# Patient Record
Sex: MALE | Race: WHITE | Employment: FULL TIME | URBAN - METROPOLITAN AREA
[De-identification: names, ages, dates, MRNs, and addresses within clinical notes are randomized per-mention and may not be internally consistent; named-entity substitution may affect disease eponyms.]

---

## 2018-03-01 ENCOUNTER — HOSPITAL ENCOUNTER (EMERGENCY)
Age: 53
Discharge: HOME | End: 2018-03-01
Attending: EMERGENCY MEDICINE
Payer: COMMERCIAL

## 2018-03-01 VITALS
DIASTOLIC BLOOD PRESSURE: 65 MMHG | WEIGHT: 160 LBS | SYSTOLIC BLOOD PRESSURE: 106 MMHG | TEMPERATURE: 100.2 F | OXYGEN SATURATION: 100 % | HEIGHT: 67 IN | BODY MASS INDEX: 25.11 KG/M2 | HEART RATE: 54 BPM | RESPIRATION RATE: 16 BRPM

## 2018-03-01 DIAGNOSIS — J10.1 INFLUENZA DUE TO INFLUENZA VIRUS, TYPE B: Primary | ICD-10-CM

## 2018-03-01 LAB
DIRECT EXAM: ABNORMAL
Lab: ABNORMAL
SPECIMEN DESCRIPTION: ABNORMAL
STATUS: ABNORMAL

## 2018-03-01 PROCEDURE — 87804 INFLUENZA ASSAY W/OPTIC: CPT

## 2018-03-01 PROCEDURE — 99283 EMERGENCY DEPT VISIT LOW MDM: CPT

## 2018-03-01 RX ORDER — OSELTAMIVIR PHOSPHATE 75 MG/1
75 CAPSULE ORAL 2 TIMES DAILY
Qty: 10 CAPSULE | Refills: 1 | Status: SHIPPED | OUTPATIENT
Start: 2018-03-01 | End: 2018-03-06

## 2018-03-01 NOTE — ED PROVIDER NOTES
[facility] ED  [address] [place] [address]  Phone: [phone]  Fax: [phone]    Pt Name: Austen Sharp  MRN: [id number]  [place] 1965  Date of evaluation: 3/1/2018      CHIEF COMPLAINT       Chief Complaint   Patient presents with    Fever     x 2-3 days    Generalized Body Aches    Cough         HISTORY OF PRESENT ILLNESS     Austen Sharp is a 45 y.o. male who presents With fever for the past 2-3 days but worsened overnight with body aches coughing without sputum production and nasal congestion. No sore throat. No headache. He has some diffuse muscle aches. There is no wheezing shortness of breath chest pain palpitations or hemoptysis. No abdominal pain. He's been able to eat and drink normally. He brought himself here for care. His vital signs are normal with exception of a temp of 37.9 upon admission        REVIEW OF SYSTEMS         REVIEW OF SYSTEMS    Constitutional:  As above  Eyes:  Denies vision changes  HEENT: As above  Respiratory:  As above  Cardiovascular:  Denies chest pain  GI:  Denies abdominal pain, nausea, vomiting, or diarrhea   Musculoskeletal: As above  Skin:  No rash on exposed surfaces   Neurologic:  Normal baseline mentation. No new deficits. Lymphatic:   No nodes or infection  Psychiatric:  No psychosis. Alert and interacting normally. Other ROS negative except as noted above. PAST MEDICAL HISTORY    has no past medical history on file. SURGICAL HISTORY      has no past surgical history on file. CURRENT MEDICATIONS       Previous Medications    No medications on file       ALLERGIES     has No Known Allergies. FAMILY HISTORY     has no family status information on file. family history is not on file. SOCIAL HISTORY      reports that he has quit smoking. He has never used smokeless tobacco. He reports that he does not drink alcohol or use drugs.     PHYSICAL EXAM     INITIAL VITALS:  height is 5' 7\" (1.702 m) and

## 2021-07-19 ENCOUNTER — HOSPITAL ENCOUNTER (EMERGENCY)
Age: 56
Discharge: ANOTHER HOSPITAL | End: 2021-07-20
Attending: EMERGENCY MEDICINE
Payer: COMMERCIAL

## 2021-07-19 ENCOUNTER — APPOINTMENT (OUTPATIENT)
Dept: GENERAL RADIOLOGY | Age: 56
End: 2021-07-19
Payer: COMMERCIAL

## 2021-07-19 ENCOUNTER — APPOINTMENT (OUTPATIENT)
Dept: CT IMAGING | Age: 56
End: 2021-07-19
Payer: COMMERCIAL

## 2021-07-19 DIAGNOSIS — S47.2XXA CRUSHING INJURY OF LEFT UPPER EXTREMITY, INITIAL ENCOUNTER: Primary | ICD-10-CM

## 2021-07-19 LAB
ABSOLUTE EOS #: 0 K/UL (ref 0–0.4)
ABSOLUTE IMMATURE GRANULOCYTE: ABNORMAL K/UL (ref 0–0.3)
ABSOLUTE LYMPH #: 1 K/UL (ref 1–4.8)
ABSOLUTE MONO #: 0.5 K/UL (ref 0.1–1.3)
ALBUMIN SERPL-MCNC: 4.8 G/DL (ref 3.5–5.2)
ALBUMIN/GLOBULIN RATIO: ABNORMAL (ref 1–2.5)
ALP BLD-CCNC: 55 U/L (ref 40–129)
ALT SERPL-CCNC: 16 U/L (ref 5–41)
ANION GAP SERPL CALCULATED.3IONS-SCNC: 10 MMOL/L (ref 9–17)
AST SERPL-CCNC: 29 U/L
BASOPHILS # BLD: 1 % (ref 0–2)
BASOPHILS ABSOLUTE: 0.1 K/UL (ref 0–0.2)
BILIRUB SERPL-MCNC: 0.26 MG/DL (ref 0.3–1.2)
BUN BLDV-MCNC: 26 MG/DL (ref 6–20)
BUN/CREAT BLD: ABNORMAL (ref 9–20)
CALCIUM SERPL-MCNC: 10 MG/DL (ref 8.6–10.4)
CHLORIDE BLD-SCNC: 102 MMOL/L (ref 98–107)
CO2: 28 MMOL/L (ref 20–31)
CREAT SERPL-MCNC: 1.19 MG/DL (ref 0.7–1.2)
DIFFERENTIAL TYPE: ABNORMAL
EOSINOPHILS RELATIVE PERCENT: 0 % (ref 0–4)
GFR AFRICAN AMERICAN: >60 ML/MIN
GFR NON-AFRICAN AMERICAN: 60 ML/MIN
GFR NON-AFRICAN AMERICAN: >60 ML/MIN
GFR SERPL CREATININE-BSD FRML MDRD: >60 ML/MIN
GFR SERPL CREATININE-BSD FRML MDRD: ABNORMAL ML/MIN/{1.73_M2}
GLUCOSE BLD-MCNC: 129 MG/DL (ref 70–99)
HCT VFR BLD CALC: 39.5 % (ref 41–53)
HEMOGLOBIN: 13.2 G/DL (ref 13.5–17.5)
IMMATURE GRANULOCYTES: ABNORMAL %
LYMPHOCYTES # BLD: 8 % (ref 24–44)
MCH RBC QN AUTO: 31 PG (ref 26–34)
MCHC RBC AUTO-ENTMCNC: 33.4 G/DL (ref 31–37)
MCV RBC AUTO: 92.9 FL (ref 80–100)
MONOCYTES # BLD: 4 % (ref 1–7)
NRBC AUTOMATED: ABNORMAL PER 100 WBC
PDW BLD-RTO: 14.1 % (ref 11.5–14.9)
PLATELET # BLD: 235 K/UL (ref 150–450)
PLATELET ESTIMATE: ABNORMAL
PMV BLD AUTO: 7.8 FL (ref 6–12)
POC CREATININE: 1.24 MG/DL (ref 0.51–1.19)
POTASSIUM SERPL-SCNC: 4.2 MMOL/L (ref 3.7–5.3)
RBC # BLD: 4.26 M/UL (ref 4.5–5.9)
RBC # BLD: ABNORMAL 10*6/UL
SEG NEUTROPHILS: 87 % (ref 36–66)
SEGMENTED NEUTROPHILS ABSOLUTE COUNT: 10.3 K/UL (ref 1.3–9.1)
SODIUM BLD-SCNC: 140 MMOL/L (ref 135–144)
TOTAL CK: 485 U/L (ref 39–308)
TOTAL PROTEIN: 7.9 G/DL (ref 6.4–8.3)
WBC # BLD: 12 K/UL (ref 3.5–11)
WBC # BLD: ABNORMAL 10*3/UL

## 2021-07-19 PROCEDURE — 96374 THER/PROPH/DIAG INJ IV PUSH: CPT

## 2021-07-19 PROCEDURE — 85025 COMPLETE CBC W/AUTO DIFF WBC: CPT

## 2021-07-19 PROCEDURE — 73080 X-RAY EXAM OF ELBOW: CPT

## 2021-07-19 PROCEDURE — 6360000002 HC RX W HCPCS: Performed by: EMERGENCY MEDICINE

## 2021-07-19 PROCEDURE — 80053 COMPREHEN METABOLIC PANEL: CPT

## 2021-07-19 PROCEDURE — 82550 ASSAY OF CK (CPK): CPT

## 2021-07-19 PROCEDURE — 36415 COLL VENOUS BLD VENIPUNCTURE: CPT

## 2021-07-19 PROCEDURE — 73110 X-RAY EXAM OF WRIST: CPT

## 2021-07-19 PROCEDURE — 6360000004 HC RX CONTRAST MEDICATION: Performed by: EMERGENCY MEDICINE

## 2021-07-19 PROCEDURE — 73206 CT ANGIO UPR EXTRM W/O&W/DYE: CPT

## 2021-07-19 PROCEDURE — 2580000003 HC RX 258: Performed by: EMERGENCY MEDICINE

## 2021-07-19 PROCEDURE — 73090 X-RAY EXAM OF FOREARM: CPT

## 2021-07-19 PROCEDURE — 82565 ASSAY OF CREATININE: CPT

## 2021-07-19 PROCEDURE — 99284 EMERGENCY DEPT VISIT MOD MDM: CPT

## 2021-07-19 RX ORDER — 0.9 % SODIUM CHLORIDE 0.9 %
80 INTRAVENOUS SOLUTION INTRAVENOUS ONCE
Status: COMPLETED | OUTPATIENT
Start: 2021-07-19 | End: 2021-07-20

## 2021-07-19 RX ORDER — SODIUM CHLORIDE 0.9 % (FLUSH) 0.9 %
10 SYRINGE (ML) INJECTION PRN
Status: DISCONTINUED | OUTPATIENT
Start: 2021-07-19 | End: 2021-07-20 | Stop reason: HOSPADM

## 2021-07-19 RX ORDER — MORPHINE SULFATE 4 MG/ML
4 INJECTION, SOLUTION INTRAMUSCULAR; INTRAVENOUS ONCE
Status: COMPLETED | OUTPATIENT
Start: 2021-07-19 | End: 2021-07-19

## 2021-07-19 RX ORDER — 0.9 % SODIUM CHLORIDE 0.9 %
1000 INTRAVENOUS SOLUTION INTRAVENOUS ONCE
Status: COMPLETED | OUTPATIENT
Start: 2021-07-19 | End: 2021-07-20

## 2021-07-19 RX ADMIN — SODIUM CHLORIDE 1000 ML: 9 INJECTION, SOLUTION INTRAVENOUS at 22:34

## 2021-07-19 RX ADMIN — SODIUM CHLORIDE, PRESERVATIVE FREE 10 ML: 5 INJECTION INTRAVENOUS at 23:14

## 2021-07-19 RX ADMIN — IOPAMIDOL 100 ML: 755 INJECTION, SOLUTION INTRAVENOUS at 23:14

## 2021-07-19 RX ADMIN — MORPHINE SULFATE 4 MG: 4 INJECTION, SOLUTION INTRAMUSCULAR; INTRAVENOUS at 22:34

## 2021-07-19 RX ADMIN — SODIUM CHLORIDE 80 ML: 9 INJECTION, SOLUTION INTRAVENOUS at 23:14

## 2021-07-19 ASSESSMENT — ENCOUNTER SYMPTOMS
ABDOMINAL PAIN: 0
EYE PAIN: 0
SHORTNESS OF BREATH: 0
BACK PAIN: 0
COLOR CHANGE: 0

## 2021-07-19 ASSESSMENT — PAIN SCALES - GENERAL: PAINLEVEL_OUTOF10: 9

## 2021-07-20 ENCOUNTER — HOSPITAL ENCOUNTER (EMERGENCY)
Age: 56
Discharge: HOME | End: 2021-07-20
Attending: EMERGENCY MEDICINE
Payer: COMMERCIAL

## 2021-07-20 VITALS
WEIGHT: 160 LBS | OXYGEN SATURATION: 97 % | DIASTOLIC BLOOD PRESSURE: 78 MMHG | HEART RATE: 60 BPM | BODY MASS INDEX: 25.11 KG/M2 | RESPIRATION RATE: 16 BRPM | HEIGHT: 67 IN | SYSTOLIC BLOOD PRESSURE: 108 MMHG | TEMPERATURE: 97.9 F

## 2021-07-20 VITALS
HEART RATE: 88 BPM | TEMPERATURE: 99.1 F | OXYGEN SATURATION: 93 % | DIASTOLIC BLOOD PRESSURE: 77 MMHG | WEIGHT: 160 LBS | BODY MASS INDEX: 25.06 KG/M2 | SYSTOLIC BLOOD PRESSURE: 105 MMHG | RESPIRATION RATE: 18 BRPM

## 2021-07-20 DIAGNOSIS — S49.92XA INJURY OF LEFT UPPER EXTREMITY, INITIAL ENCOUNTER: Primary | ICD-10-CM

## 2021-07-20 PROCEDURE — 6360000002 HC RX W HCPCS: Performed by: STUDENT IN AN ORGANIZED HEALTH CARE EDUCATION/TRAINING PROGRAM

## 2021-07-20 PROCEDURE — 6360000002 HC RX W HCPCS: Performed by: EMERGENCY MEDICINE

## 2021-07-20 PROCEDURE — 99285 EMERGENCY DEPT VISIT HI MDM: CPT

## 2021-07-20 PROCEDURE — 96374 THER/PROPH/DIAG INJ IV PUSH: CPT

## 2021-07-20 PROCEDURE — 90471 IMMUNIZATION ADMIN: CPT | Performed by: EMERGENCY MEDICINE

## 2021-07-20 PROCEDURE — 90715 TDAP VACCINE 7 YRS/> IM: CPT | Performed by: EMERGENCY MEDICINE

## 2021-07-20 RX ORDER — OXYCODONE HYDROCHLORIDE AND ACETAMINOPHEN 5; 325 MG/1; MG/1
1 TABLET ORAL EVERY 6 HOURS PRN
Qty: 12 TABLET | Refills: 0 | Status: SHIPPED | OUTPATIENT
Start: 2021-07-20 | End: 2021-07-23

## 2021-07-20 RX ADMIN — HYDROMORPHONE HYDROCHLORIDE 1 MG: 1 INJECTION, SOLUTION INTRAMUSCULAR; INTRAVENOUS; SUBCUTANEOUS at 03:41

## 2021-07-20 RX ADMIN — TETANUS TOXOID, REDUCED DIPHTHERIA TOXOID AND ACELLULAR PERTUSSIS VACCINE, ADSORBED 0.5 ML: 5; 2.5; 8; 8; 2.5 SUSPENSION INTRAMUSCULAR at 00:30

## 2021-07-20 ASSESSMENT — ENCOUNTER SYMPTOMS
NAUSEA: 0
ABDOMINAL PAIN: 0
SORE THROAT: 0
VOMITING: 0
RHINORRHEA: 0
COUGH: 0
SHORTNESS OF BREATH: 0
DIARRHEA: 0

## 2021-07-20 ASSESSMENT — PAIN DESCRIPTION - LOCATION
LOCATION: ARM
LOCATION: ARM

## 2021-07-20 ASSESSMENT — PAIN SCALES - GENERAL
PAINLEVEL_OUTOF10: 8
PAINLEVEL_OUTOF10: 2
PAINLEVEL_OUTOF10: 8

## 2021-07-20 ASSESSMENT — PAIN DESCRIPTION - PAIN TYPE
TYPE: ACUTE PAIN
TYPE: ACUTE PAIN

## 2021-07-20 ASSESSMENT — PAIN DESCRIPTION - ONSET: ONSET: SUDDEN

## 2021-07-20 ASSESSMENT — PAIN DESCRIPTION - ORIENTATION
ORIENTATION: LEFT
ORIENTATION: LEFT

## 2021-07-20 ASSESSMENT — PAIN DESCRIPTION - DESCRIPTORS: DESCRIPTORS: SHARP;SHOOTING

## 2021-07-20 ASSESSMENT — PAIN - FUNCTIONAL ASSESSMENT: PAIN_FUNCTIONAL_ASSESSMENT: PREVENTS OR INTERFERES SOME ACTIVE ACTIVITIES AND ADLS

## 2021-07-20 ASSESSMENT — PAIN DESCRIPTION - FREQUENCY: FREQUENCY: INTERMITTENT

## 2021-07-20 ASSESSMENT — PAIN DESCRIPTION - PROGRESSION: CLINICAL_PROGRESSION: GRADUALLY WORSENING

## 2021-07-20 NOTE — ED NOTES
Bed: 03  Expected date: 7/20/21  Expected time: 3:06 AM  Means of arrival: [facility]  Comments:  [address] transfer  59 yo M   - r/o LUE compartment syndrome   -crush injury earlier, progressive increase swelling, distally nv intact,   Pain with passive extension,   Vss  Dr. Seamus Cain accepted     Chen Bruce, RN  07/20/21 3576

## 2021-07-20 NOTE — ED NOTES
Report given to [facility], RN from ED. Report method by phone   The following was reviewed with receiving RN:   Current vital signs:  /78   Pulse 60   Temp 97.9 °F (36.6 °C) (Oral)   Resp 16   Ht 5' 7\" (1.702 m)   Wt 160 lb (72.6 kg)   SpO2 97%   BMI 25.06 kg/m²                MEWS Score: 2     Any medication or safety alerts were reviewed. Any pending diagnostics and notifications were also reviewed, as well as any safety concerns or issues, abnormal labs, abnormal imaging, and abnormal assessment findings. Questions were answered.             Luigi Zelaya RN  07/20/21 7160

## 2021-07-20 NOTE — CONSULTS
Orthopaedic Surgery Consult  (Dr. Saige Mahmood)      CC/Reason for consult: Left forearm pain, rule out compartment syndrome    HPI:      The patient is a 60 y.o. right hand-dominant male who works as a  using a table saw. He states that at approximately 8 PM last night he cut his arm caught between the legs of his machinery and the ruler is that are used to guide the material into the saw. He states that the pain has been pretty steady to improving over the past 8 hours since injury. He recently received some medication, and states that he has received significant relief from his pain. He states that originally he had some difficulty moving the hand and wrist, but this is since resolved. He denies any changes in sensation to the left upper extremity. He denies any medical history. He denies blood thinner use. Past Medical History:    History reviewed. No pertinent past medical history. Past Surgical History:    History reviewed. No pertinent surgical history. Medications Prior to Admission:   Prior to Admission medications    Not on File     Allergies:    Patient has no known allergies. Social History:   Social History     Socioeconomic History    Marital status:      Spouse name: None    Number of children: None    Years of education: None    Highest education level: None   Occupational History    None   Tobacco Use    Smoking status: Former Smoker    Smokeless tobacco: Never Used   Substance and Sexual Activity    Alcohol use: No    Drug use: Not Currently     Types: Marijuana    Sexual activity: Not Currently   Other Topics Concern    None   Social History Narrative    None     Social Determinants of Health     Financial Resource Strain:     Difficulty of Paying Living Expenses:    Food Insecurity:     Worried About Running Out of Food in the Last Year:     Ran Out of Food in the Last Year:    Transportation Needs:     Lack of Transportation (Medical):      Lack of Transportation (Non-Medical):    Physical Activity:     Days of Exercise per Week:     Minutes of Exercise per Session:    Stress:     Feeling of Stress :    Social Connections:     Frequency of Communication with Friends and Family:     Frequency of Social Gatherings with Friends and Family:     Attends [employer] Services:     Active Member of Clubs or Organizations:     Attends Club or Organization Meetings:     Marital Status:    Intimate Partner Violence:     Fear of Current or Ex-Partner:     Emotionally Abused:     Physically Abused:     Sexually Abused:      Family History:  History reviewed. No pertinent family history. ROS:   Constitutional: Negative for fever and chills. Respiratory: Negative for cough. Cardiovascular: Negative for chest pain. Musculoskeletal: Positive for left forearm pain. Skin: Negative for itching and rash. Neurological: Negative for numbness, tingling, weakness. PE:  Blood pressure 114/80, pulse 88, temperature 99.1 °F (37.3 °C), temperature source Oral, resp. rate 18, weight 160 lb (72.6 kg), SpO2 95 %. Gen: Alert and oriented, NAD, cooperative, resting comfortably with arm elevated on blankets. Head: Normocephalic, atraumatic. Cardiovascular: Rate regular. Respiratory: Chest symmetric, no accessory muscle use. LUE: Small superficial abrasion over the middle third of his dorsal forearm. Minimal tenderness to palpation of the anterior/posterior compartments. Moderate tenderness with combined full extension of the wrist/digits. Minimal tenderness if wrist/fingers moved in isolation. No crepitus. Compartments slightly firmer than contralateral side, but still soft and easily compressible. Ulnar/median/AIN/PIN/radial motor intact. Axillary/MCN/median/ulnar/radial nerves SILT. Radial pulse 2+ with BCR.     Labs:  Recent Labs     07/19/21 2229 07/19/21 2234   WBC 12.0*  --    HGB 13.2*  --    HCT 39.5*  --      --      --    K 4.2 --    BUN 26*  --    CREATININE 1.19 1.24*   GLUCOSE 129*  --         Imaging:   Multiple views of the left forearm, wrist and elbow demonstrating no acute osseous abnormality    Assessment/Plan: 60 y.o. male who got his hand caught between two objects, being seen by orthopaedics to:    -Rule out left forearm compartment syndrome    -Low concern for compartment syndrome  -Patient arm elevated on additional blankets and ice applied to left forearm.  -Patient educated about the risks of compartment syndrome and its associated findings.  -Patient instructed to maintain ice and elevation of the forearm above the level of the heart until swelling and pain subsides to more tolerable levels. -WB status: Nonweightbearing for now, but may use the arm freely as swelling and pain subsides.  -Pain control per ED  -Dispo: Discussed with ED that patient is okay to discharge from orthopedic perspective, but if they would like I can compartment check 1 more time in a few hours. They opted for the latter. Anticipate discharge afterwards.  -Follow up with Dr. Jose Gayle as needed    Herlinad Cabrera MD  Resident Physician, PGY-2   Orthopaedic Surgery  4:00 AM 7/20/2021    This note is created with the assistance of a speech recognition program. While intending to generate a document that actually reflects the content of the visit, the document can still have some errors including those of syntax and sound a like substitutions which may escape proof reading. In such instances, actual meaning can be extrapolated by contextual diversion.

## 2021-07-20 NOTE — ED PROVIDER NOTES
file.      SOCIAL HISTORY       Social History     Tobacco Use    Smoking status: Former Smoker    Smokeless tobacco: Never Used   Substance Use Topics    Alcohol use: No    Drug use: Not Currently     Types: Marijuana     PHYSICAL EXAM     INITIAL VITALS: /78   Pulse 60   Temp 97.9 °F (36.6 °C) (Oral)   Resp 16   Ht 5' 7\" (1.702 m)   Wt 160 lb (72.6 kg)   SpO2 97%   BMI 25.06 kg/m²    Physical Exam  Vitals and nursing note reviewed. Constitutional:       Appearance: Normal appearance. HENT:      Head: Normocephalic and atraumatic. Right Ear: External ear normal.      Left Ear: External ear normal.      Nose: Nose normal.      Mouth/Throat:      Mouth: Mucous membranes are moist.   Eyes:      Pupils: Pupils are equal, round, and reactive to light. Cardiovascular:      Rate and Rhythm: Normal rate and regular rhythm. Pulses: Normal pulses. Heart sounds: Normal heart sounds. Pulmonary:      Effort: Pulmonary effort is normal.      Breath sounds: Normal breath sounds. Abdominal:      General: Abdomen is flat. Palpations: Abdomen is soft. Tenderness: There is no abdominal tenderness. Musculoskeletal:         General: Normal range of motion. Left forearm: Swelling and tenderness present. Cervical back: Neck supple. Comments: Soft tissue swelling noted at the left forearm, skin tear noted on the dorsal aspect of the forearm, +2 radial pulses, compartments are soft, patient did have some pain with passive extension of the fingers, sensation is intact over the fingers   Skin:     General: Skin is warm and dry. Capillary Refill: Capillary refill takes less than 2 seconds. Neurological:      General: No focal deficit present. Mental Status: He is alert and oriented to person, place, and time.    Psychiatric:         Behavior: Behavior normal.         MEDICAL DECISION MAKING:   13-year-old male presents for complaint of left arm swelling status post injury. On initial exam patient in no acute distress, vitals are stable, patient is noted to have swelling of the forearm, does have +2 radial pulses, compartments are still soft, did have some pain with passive extension, sensation is intact, given mechanism of injury, concern for soft tissue injury, x-rays were ordered in triage and were negative for acute process, will check basic labs, will check CTA as well    Labs are reviewed patient was noted a white blood cell count of 12, many other labs are unremarkable, imaging showing soft tissue swelling, no signs of bony injury    Patient is noted to have skin tear to the forearm, no need for sutures, will update tetanus    Patient still with significant swelling of the forearm, pain with passive extension of the fingers, there is concern for compartment syndrome, did discuss with orthopedic surgery Dr. Pura Cordero, recommends patient be transferred to [facility] for orthopedic evaluation    Discussed with patient results, discussed concern for possible compartment syndrome, discussed need for transfer, patient is agreeable    Discussed with Dr. Siomara Norwood from [facility] ED, will accept transfer    Vital signs stable at time of transfer         CRITICAL CARE:       PROCEDURES:    Procedures    DIAGNOSTIC RESULTS   EKG:All EKG's are interpreted by the Emergency Department Physician who either signs or Co-signs this chart in the absence of a cardiologist.        RADIOLOGY:All plain film, CT, MRI, and formal ultrasound images (except ED bedside ultrasound) are read by the radiologist, see reports below, unless otherwisenoted in MDM or here. CTA UPPER EXTREMITY LEFT W CONTRAST   Final Result   Vessels are patent to level of the wrist/radiocarpal joint. No significant   arterial enhancement involving the vasculature of the hand noted.   Unclear if   this is related to traumatic injury involving the vessels/soft tissues versus   artifact related to technique/timing or positioning of the hand and wrist.      Clinical correlation is strongly recommended with exam findings. If   indicated, further evaluation may be beneficial as warranted. XR ELBOW LEFT (MIN 3 VIEWS)   Final Result   Notable soft tissue swelling of proximal forearm with associated skin   irregularity. No fracture or radiopaque retained foreign body. XR RADIUS ULNA LEFT (2 VIEWS)   Final Result   Notable soft tissue swelling of proximal forearm with associated skin   irregularity. No fracture or radiopaque retained foreign body. XR WRIST LEFT (MIN 3 VIEWS)   Final Result   Notable soft tissue swelling of proximal forearm with associated skin   irregularity. No fracture or radiopaque retained foreign body. LABS: All lab results were reviewed by myself, and all abnormals are listed below. Labs Reviewed   CBC WITH AUTO DIFFERENTIAL - Abnormal; Notable for the following components:       Result Value    WBC 12.0 (*)     RBC 4.26 (*)     Hemoglobin 13.2 (*)     Hematocrit 39.5 (*)     Seg Neutrophils 87 (*)     Lymphocytes 8 (*)     Segs Absolute 10.30 (*)     All other components within normal limits   COMPREHENSIVE METABOLIC PANEL W/ REFLEX TO MG FOR LOW K - Abnormal; Notable for the following components:    Glucose 129 (*)     BUN 26 (*)     Total Bilirubin 0.26 (*)     All other components within normal limits   CK - Abnormal; Notable for the following components:     Total  (*)     All other components within normal limits   CREATININE W/GFR POINT OF CARE - Abnormal; Notable for the following components:    POC Creatinine 1.24 (*)     GFR Non- 60 (*)     All other components within normal limits       EMERGENCY DEPARTMENTCOURSE:         Vitals:    Vitals:    07/19/21 2041 07/20/21 0200 07/20/21 0216   BP: 97/71 107/81 108/78   Pulse: 60     Resp: 16     Temp: 97.9 °F (36.6 °C)     TempSrc: Oral     SpO2: 97%     Weight: 160 lb (72.6 kg)     Height: 5' 7\"

## 2021-07-20 NOTE — ED NOTES
Pt given 1 mg Dilaudid via IV by writer with Can Campoverde RN as witness. Computer in ED 3 is broken and writer is unable to scan patient. Pt tolerates medication well. Will continue to monitor.       Marina Lynn RN  07/20/21 4809

## 2021-07-20 NOTE — ED TRIAGE NOTES
Mode of arrival (squad #, walk in, police, etc) : walk in        Chief complaint(s): Left arm injury        Arrival Note (brief scenario, treatment PTA, etc). : Pt states he was trying to adjust a plate on a machine when his arm was injured. Pt has swelling and redness to the area. Pt has limited ROM. C= \"Have you ever felt that you should Cut down on your drinking? \"  No  A= \"Have people Annoyed you by criticizing your drinking? \"  No  G= \"Have you ever felt bad or Guilty about your drinking? \"  No  E= \"Have you ever had a drink as an Eye-opener first thing in the morning to steady your nerves or to help a hangover? \"  No      Deferred []      Reason for deferring: N/A    *If yes to two or more: probable alcohol abuse. *

## 2021-07-20 NOTE — ED PROVIDER NOTES
[facility]     Emergency Department     Faculty Attestation    I performed a history and physical examination of the patient and discussed management with the resident. I have reviewed and agree with the residents findings including all diagnostic interpretations, and treatment plans as written. Any areas of disagreement are noted on the chart. I was personally present for the key portions of any procedures. I have documented in the chart those procedures where I was not present during the key portions. I have reviewed the emergency nurses triage note. I agree with the chief complaint, past medical history, past surgical history, allergies, medications, social and family history as documented unless otherwise noted below. Documentation of the HPI, Physical Exam and Medical Decision Making performed by scribping is based on my personal performance of the HPI, PE and MDM. For Physician Assistant/ Nurse Practitioner cases/documentation I have personally evaluated this patient and have completed at least one if not all key elements of the E/M (history, physical exam, and MDM). Additional findings are as noted. 61 yo M transferred from [facility] L forearm injury, / crush mechanism, pt denies other injury, dT updated,   pe vss gcs 15, rommel, no cervical tenderness, crepitus or deformity,   Chest non tender, L forearm tender / swollen, compressible, superficial abrasion / avulsion dorsal surface, distal brisk capillary refill, resistant to passive extension L digits,     Wound care / ortho, ortho saw pt // & we concur with recheck,   0700 pt cleared for discharge home, pt has out pt follow up,     EKG Interpretation    Interpreted by me      CRITICAL CARE: There was a high probability of clinically significant/life threatening deterioration in this patient's condition which required my urgent intervention. Total critical care time was 5 minutes.   This excludes any time for separately reportable procedures.        [facility] 24, DO  07/20/21 [address], DO  07/20/21 [address] [facility], DO  07/20/21 [address], DO  07/21/21 9083

## 2021-07-20 NOTE — ED PROVIDER NOTES
[facility] ED  Emergency Department Encounter  Emergency Medicine Resident     Pt Name: Jena Diaz  MRN: [id number]  [place] 1965  Date of evaluation: 7/20/21  PCP:  Pura Carroll MD    [address] [place] [address]       Chief Complaint   Patient presents with    Arm Injury     L arm crushed        HISTORY OFPRESENT ILLNESS  (Location/Symptom, Timing/Onset, Context/Setting, Quality, Duration, Modifying Mardee Skipper.)      Jena Diaz is a 60 y.o. male who presents as a transfer from [facility] due to concern for compartment syndrome of the left forearm. Earlier today, he was using a saw when he had a crush injury where his left forearm was pinned between a saw and the wall. He had worsening pain and swelling, so he was transferred from [facility] here for orthopedic surgery evaluation. This was updated at [facility] and x-rays were negative for fracture. He currently describes the pain is 8 out of 10, constant, dull and aching. No numbness/tingling of the fingers. He is unable to make a fist due to pain. PAST MEDICAL / SURGICAL / SOCIAL / FAMILY HISTORY      has no past medical history on file. has no past surgical history on file. Social:  reports that he has quit smoking. He has never used smokeless tobacco. He reports previous drug use. Drug: Marijuana. He reports that he does not drink alcohol. Family Hx: History reviewed. No pertinent family history. Allergies:  Patient has no known allergies. Home Medications:  Prior to Admission medications    Medication Sig Start Date End Date Taking? Authorizing Provider   oxyCODONE-acetaminophen (PERCOCET) 5-325 MG per tablet Take 1 tablet by mouth every 6 hours as needed for Pain for up to 3 days. Intended supply: 3 days.  Take lowest dose possible to manage pain 7/20/21 7/23/21 Yes Alisha Pittman MD       REVIEW OFSYSTEMS    (2-9 systems for level 4, 10 or more for level 5)      Review of Systems back: Normal range of motion and neck supple. Comments: Left forearm with significant edema; arm is soft. overlying 4 cm superficial laceration that is hemostatic. Patient has pain with passive extension of the digits. Neurovascularly intact. Skin:     General: Skin is warm. Capillary Refill: Capillary refill takes less than 2 seconds. Neurological:      General: No focal deficit present. Mental Status: He is alert and oriented to person, place, and time. Psychiatric:         Mood and Affect: Mood normal.         Behavior: Behavior normal.         DIFFERENTIAL  DIAGNOSIS     Initial MDM/Plan: 60 y.o. male who presents with left forearm pain and swelling in the context of fresh injury. Transfer from [facility] for orthopedic evaluation to concern for compartment syndrome. On examination, patient is uncomfortable but normotensive, not tachycardic, afebrile. He has some moderate swelling to the left forearm with an overlying laceration. It is tender to palpation but soft. Pulses intact. Patient is able to flex and extend his fingers but has pain with passive extension. Plan for pain control, wound care, orthopedic evaluation. DIAGNOSTIC RESULTS / EMERGENCYDEPARTMENT COURSE / MDM     LABS:  Labs Reviewed - No data to display      RADIOLOGY:  No results found. EMERGENCY DEPARTMENT COURSE:  ED Course as of Jul 21 0649   Wed Jul 21, 2021   0647 Orthopedic surgery evaluated the patient and had low suspicion for compartment syndrome. The decision was made to keep the patient for a few more hours in the emergency department to be evaluated again around 7 AM on 7/20 5 orthopedic surgery. If examination remains relatively unchanged, patient to discharge home with follow-up. Patient signed out to Dr. Madie Diallo at 0600 on 7/20    [JT]      ED Course User Index  [JT] Arjun England MD      PROCEDURES:  None    CONSULTS:  IP CONSULT TO ORTHOPEDIC SURGERY      FINAL IMPRESSION      1.  Injury of left upper extremity, initial encounter        DISPOSITION / PLAN     DISPOSITION Decision To Discharge 07/20/2021 07:08:21 AM      PATIENT REFERRED TO:  Meg Almonte MD  [address] [address]  [phone]      As needed    [facility] ED  [address] [facility] [address]  [phone]  Go to   If symptoms worsen    [place] [address]  [phone]  Schedule an appointment as soon as possible for a visit in 3 days  As needed      DISCHARGE MEDICATIONS:  Discharge Medication List as of 7/20/2021  6:02 AM      START taking these medications    Details   oxyCODONE-acetaminophen (PERCOCET) 5-325 MG per tablet Take 1 tablet by mouth every 6 hours as needed for Pain for up to 3 days. Intended supply: 3 days.  Take lowest dose possible to manage pain, Disp-12 tablet, R-0Print             Arun Champagne MD  Emergency Medicine Resident    (Please note that portions of this note were completed with a voice recognition program.Efforts were made to edit the dictations but occasionally words are mis-transcribed.)        Arun Champagne MD  Resident  07/21/21 9050

## 2021-07-20 NOTE — ED TRIAGE NOTES
Pt presents to ED as a transfer from [facility] after sustaining a crush injury to his L forearm @ work around 2030. Pt was running a plate saw, and got his arm caught between two rollers. Pt transferred for Ortho consult and concern for compartment syndrome. Pt received tetanus @ [facility]. Pt is A&Ox4, placed on partial monitor, IV established PTA. Pt has non-pitting edema on his forearm as well as a 4 inch laceration. Bleeding controlled @ this time, and wound is exposed. Pt has radial pulses bilaterally, equal, 2+. Pt is able to move his thumb and index finger on L hand, but does not move his other fingers because of the pain.

## 2021-07-20 NOTE — ED PROVIDER NOTES
[facility] ED  Emergency Department  Emergency Medicine Resident Sign-out     Care of Linda Manuel was assumed from Dr. Dacia Kaplan and is being seen for Arm Injury (L arm crushed )  . The patient's initial evaluation and plan have been discussed with the prior provider who initially evaluated the patient. EMERGENCY DEPARTMENT COURSE / MEDICAL DECISION MAKING:       MEDICATIONS GIVEN:  Orders Placed This Encounter   Medications    HYDROmorphone (DILAUDID) injection 1 mg    oxyCODONE-acetaminophen (PERCOCET) 5-325 MG per tablet     Sig: Take 1 tablet by mouth every 6 hours as needed for Pain for up to 3 days. Intended supply: 3 days. Take lowest dose possible to manage pain     Dispense:  12 tablet     Refill:  0       LABS / RADIOLOGY:     Labs Reviewed - No data to display    XR ELBOW LEFT (MIN 3 VIEWS)    Result Date: 7/19/2021  EXAMINATION: XRAY VIEWS OF THE LEFT WRIST; TWO XRAY VIEWS OF THE LEFT FOREARM; THREE XRAY VIEWS OF THE LEFT ELBOW 7/19/2021 5:59 pm COMPARISON: None. HISTORY: ORDERING SYSTEM PROVIDED HISTORY: pain with palpation TECHNOLOGIST PROVIDED HISTORY: pain with palpation Reason for Exam: PT CO extreme pain with multiple lacerations and inability to move left arm after having it crushed in a machine while at work today. PT arm appears swollen and puffy at this time. Acuity: Acute Type of Exam: Initial; ORDERING SYSTEM PROVIDED HISTORY: pain and swelling after injury at work TECHNOLOGIST PROVIDED HISTORY: pain and swelling after injury at work Reason for Exam: PT CO extreme pain with multiple lacerations and inability to move left arm after having it crushed in a machine while at work today. PT arm appears swollen and puffy at this time. Acuity: Acute Type of Exam: Initial FINDINGS: Notable soft tissue swelling of the proximal forearm with overlying skin irregularity. No associated fracture or dislocation. No definitive radiopaque retained foreign body.      Notable soft tissue swelling of proximal forearm with associated skin irregularity. No fracture or radiopaque retained foreign body. XR RADIUS ULNA LEFT (2 VIEWS)    Result Date: 7/19/2021  EXAMINATION: XRAY VIEWS OF THE LEFT WRIST; TWO XRAY VIEWS OF THE LEFT FOREARM; THREE XRAY VIEWS OF THE LEFT ELBOW 7/19/2021 5:59 pm COMPARISON: None. HISTORY: ORDERING SYSTEM PROVIDED HISTORY: pain with palpation TECHNOLOGIST PROVIDED HISTORY: pain with palpation Reason for Exam: PT CO extreme pain with multiple lacerations and inability to move left arm after having it crushed in a machine while at work today. PT arm appears swollen and puffy at this time. Acuity: Acute Type of Exam: Initial; ORDERING SYSTEM PROVIDED HISTORY: pain and swelling after injury at work TECHNOLOGIST PROVIDED HISTORY: pain and swelling after injury at work Reason for Exam: PT CO extreme pain with multiple lacerations and inability to move left arm after having it crushed in a machine while at work today. PT arm appears swollen and puffy at this time. Acuity: Acute Type of Exam: Initial FINDINGS: Notable soft tissue swelling of the proximal forearm with overlying skin irregularity. No associated fracture or dislocation. No definitive radiopaque retained foreign body. Notable soft tissue swelling of proximal forearm with associated skin irregularity. No fracture or radiopaque retained foreign body. XR WRIST LEFT (MIN 3 VIEWS)    Result Date: 7/19/2021  EXAMINATION: XRAY VIEWS OF THE LEFT WRIST; TWO XRAY VIEWS OF THE LEFT FOREARM; THREE XRAY VIEWS OF THE LEFT ELBOW 7/19/2021 5:59 pm COMPARISON: None. HISTORY: ORDERING SYSTEM PROVIDED HISTORY: pain with palpation TECHNOLOGIST PROVIDED HISTORY: pain with palpation Reason for Exam: PT CO extreme pain with multiple lacerations and inability to move left arm after having it crushed in a machine while at work today. PT arm appears swollen and puffy at this time.  Acuity: Acute Type of Exam: Initial; ORDERING SYSTEM PROVIDED HISTORY: pain and swelling after injury at work TECHNOLOGIST PROVIDED HISTORY: pain and swelling after injury at work Reason for Exam: PT CO extreme pain with multiple lacerations and inability to move left arm after having it crushed in a machine while at work today. PT arm appears swollen and puffy at this time. Acuity: Acute Type of Exam: Initial FINDINGS: Notable soft tissue swelling of the proximal forearm with overlying skin irregularity. No associated fracture or dislocation. No definitive radiopaque retained foreign body. Notable soft tissue swelling of proximal forearm with associated skin irregularity. No fracture or radiopaque retained foreign body. CTA UPPER EXTREMITY LEFT W CONTRAST    Result Date: 7/20/2021  EXAMINATION: CTA OF THE LEFT UPPER EXTREMITY WITH CONTRAST 7/19/2021 10:58 pm TECHNIQUE: CTA of the left upper extremity was performed with the administration of intravenous contrast. Multiplanar reformatted images are provided for review. MIP images are provided for review. Dose modulation, iterative reconstruction, and/or weight based adjustment of the mA/kV was utilized to reduce the radiation dose to as low as reasonably achievable. COMPARISON: Forearm and wrist x-rays 07/19/2021 HISTORY ORDERING SYSTEM PROVIDED HISTORY: crush injury TECHNOLOGIST PROVIDED HISTORY: crush injury Decision Support Exception - unselect if not a suspected or confirmed emergency medical condition->Emergency Medical Condition (MA) Reason for Exam: crush injury, patient states his arm got stuck in a \"roller\" machine a work this afternoon Acuity: Acute Type of Exam: Initial FINDINGS: Artifact related to increased quantum mottle challenge evaluation. There is suboptimal visualization of the arterial vessels of the hand and wrist.  No significant contrast opacification past the level of the distal radius and ulna noted.   Unclear is related to the traumatic injury or timing contrast bolus versus degree of quantum mottle related to being images level of the patient's side. Otherwise vessels proximal to the wrist involving arm and forearm are patent without evidence traumatic injury. Vessels are patent to level of the wrist/radiocarpal joint. No significant arterial enhancement involving the vasculature of the hand noted. Unclear if this is related to traumatic injury involving the vessels/soft tissues versus artifact related to technique/timing or positioning of the hand and wrist. Clinical correlation is strongly recommended with exam findings. If indicated, further evaluation may be beneficial as warranted. RECENT VITALS:     Temp: 99.1 °F (37.3 °C),  Pulse: 88, Resp: 18, BP: 105/77, SpO2: 93 %    This patient is a 60 y.o. Male with pain to left upper extremity after having arm caught between a saw and a wall. He was transferred from [facility] due to concern for crush injury due to pain with passive extension and swelling. X-ray not show any acute fracture and tetanus was updated at [facility]. He was evaluated by orthopedic surgery here with low concern for crush injury. Ortho to reevaluate at 7 AM with likely discharge. Ortho reevaluated patient, had in-depth discussion with patient regarding follow-up and gave strict return precautions. Patient Verbalized understanding. Patient remained stable in the emergency department with improving vital signs. Gave strict return precautions to the emergency department and discharge patient home. Recommended patient follow-up with orthopedics as discussed. OUTSTANDING TASKS / RECOMMENDATIONS:    1. Ortho reevaluation  2. Disposition     FINAL IMPRESSION:     1.  Injury of left upper extremity, initial encounter        DISPOSITION:         DISPOSITION:  [x]  Discharge   []  Transfer -    []  Admission -     []  Against Medical Advice   []  Eloped   FOLLOW-UP: Chris Bear MD  [address] [place] [id number]  [phone]      As needed    [facility] ED  [address] [address]  [phone]  Go to   If symptoms worsen    Sonam Mccrary PRINCESS  [address] [id number] [phone]  Schedule an appointment as soon as possible for a visit in 3 days  As needed     DISCHARGE MEDICATIONS: Discharge Medication List as of 7/20/2021  6:02 AM      START taking these medications    Details   oxyCODONE-acetaminophen (PERCOCET) 5-325 MG per tablet Take 1 tablet by mouth every 6 hours as needed for Pain for up to 3 days. Intended supply: 3 days.  Take lowest dose possible to manage pain, Disp-12 tablet, R-0Print                 Mary Driscoll MD  Emergency Medicine Resident  [facility]       Mary Driscoll MD  Resident  07/20/21 0330

## 2021-07-21 ENCOUNTER — OFFICE VISIT (OUTPATIENT)
Dept: ORTHOPEDIC SURGERY | Age: 56
End: 2021-07-21
Payer: COMMERCIAL

## 2021-07-21 VITALS — BODY MASS INDEX: 25.11 KG/M2 | WEIGHT: 160 LBS | HEIGHT: 67 IN

## 2021-07-21 DIAGNOSIS — S47.2XXD CRUSHING INJURY OF LEFT UPPER EXTREMITY, SUBSEQUENT ENCOUNTER: Primary | ICD-10-CM

## 2021-07-21 PROCEDURE — 99214 OFFICE O/P EST MOD 30 MIN: CPT | Performed by: ORTHOPAEDIC SURGERY

## 2021-07-21 RX ORDER — CEPHALEXIN 500 MG/1
500 CAPSULE ORAL 4 TIMES DAILY
Qty: 28 CAPSULE | Refills: 0 | Status: SHIPPED | OUTPATIENT
Start: 2021-07-21 | End: 2021-07-28

## 2021-07-21 ASSESSMENT — ENCOUNTER SYMPTOMS
ABDOMINAL PAIN: 0
VOMITING: 0
COUGH: 0
APNEA: 0
CHEST TIGHTNESS: 0

## 2021-07-21 NOTE — PROGRESS NOTES
[facility]  [facility]  [address] [facility] [address]  Dept: [phone]  Dept Fax: [phone]        Ambulatory Follow Up      Subjective:   Sam Farrell is a 60y.o. year old male who presents to our office today for routine followup regarding his   1. Crushing injury of left upper extremity, subsequent encounter    . Chief Complaint   Patient presents with    Follow-up     [facility]- LEFT UPPER EXTREMITY INJURY 07/19/2021 -        HPI- patient in the office today in follow up for a work related injury that was sustained on 7/19/21. The patient reports that he got his left forearm caught in between two pieces of machinery. The patient was then admitted to the hospital same day for concerns of compartment syndrome. The patient did have compartment syndrome ruled out and was sent home to work on edema control and pain control. The patient has been off of work since his injury. The patient in the office today with complaints of increased swelling. He states that he tried to wear his watch but it was too tight. The patient has been taking percocet for his pain at this time. Review of Systems   Constitutional: Negative for chills and fever. Respiratory: Negative for apnea, cough and chest tightness. Cardiovascular: Negative for chest pain and palpitations. Gastrointestinal: Negative for abdominal pain and vomiting. Genitourinary: Negative for difficulty urinating. Musculoskeletal: Positive for arthralgias (left forearm). Negative for gait problem, joint swelling and myalgias. Neurological: Negative for dizziness, weakness and numbness. I have reviewed the CC, HPI, ROS, PMH, FHX, Social History, and if not present in this note, I have reviewed in the patient's chart. I agree with the documentation provided by other staff and have reviewed their documentation prior to providing my signature indicating agreement.     Objective :   Ht 5' 7\" (1.702 m)   Wt 160 lb (72.6 kg)   BMI 25.06 kg/m²  Body mass index is 25.06 kg/m². General: Elizabeth Armenta is a 60 y.o. male who is alert and oriented and sitting comfortably in our office. Ortho Exam  MS:  Moderate swelling left forearm, compartments are soft. Minimal hand pain left with passive flexion/extension of fingers. Abrasions dorsal left forearm with minimal surrounding erythema and no signs of infection. Motor, sensory, vascular examination of the left upper extremity is grossly intact without focal deficits. Neuro: alert and oriented to person and place. Eyes: Extra-ocular muscles intact  Mouth: Oral mucosa moist. No perioral lesions  Pulm: Respirations unlabored and regular. Symmetric chest excursion without outward deformity is noted. Skin: warm, well perfused  Psych:   Patient has good fund of knowledge and displays understanging of exam, diagnosis, and plan. Radiology:   XR ELBOW LEFT (MIN 3 VIEWS)    Result Date: 7/19/2021  EXAMINATION: XRAY VIEWS OF THE LEFT WRIST; TWO XRAY VIEWS OF THE LEFT FOREARM; THREE XRAY VIEWS OF THE LEFT ELBOW 7/19/2021 5:59 pm COMPARISON: None. HISTORY: ORDERING SYSTEM PROVIDED HISTORY: pain with palpation TECHNOLOGIST PROVIDED HISTORY: pain with palpation Reason for Exam: PT CO extreme pain with multiple lacerations and inability to move left arm after having it crushed in a machine while at work today. PT arm appears swollen and puffy at this time. Acuity: Acute Type of Exam: Initial; ORDERING SYSTEM PROVIDED HISTORY: pain and swelling after injury at work TECHNOLOGIST PROVIDED HISTORY: pain and swelling after injury at work Reason for Exam: PT CO extreme pain with multiple lacerations and inability to move left arm after having it crushed in a machine while at work today. PT arm appears swollen and puffy at this time. Acuity: Acute Type of Exam: Initial FINDINGS: Notable soft tissue swelling of the proximal forearm with overlying skin irregularity.   No associated fracture or dislocation. No definitive radiopaque retained foreign body. Notable soft tissue swelling of proximal forearm with associated skin irregularity. No fracture or radiopaque retained foreign body. XR RADIUS ULNA LEFT (2 VIEWS)    Result Date: 7/19/2021  EXAMINATION: XRAY VIEWS OF THE LEFT WRIST; TWO XRAY VIEWS OF THE LEFT FOREARM; THREE XRAY VIEWS OF THE LEFT ELBOW 7/19/2021 5:59 pm COMPARISON: None. HISTORY: ORDERING SYSTEM PROVIDED HISTORY: pain with palpation TECHNOLOGIST PROVIDED HISTORY: pain with palpation Reason for Exam: PT CO extreme pain with multiple lacerations and inability to move left arm after having it crushed in a machine while at work today. PT arm appears swollen and puffy at this time. Acuity: Acute Type of Exam: Initial; ORDERING SYSTEM PROVIDED HISTORY: pain and swelling after injury at work TECHNOLOGIST PROVIDED HISTORY: pain and swelling after injury at work Reason for Exam: PT CO extreme pain with multiple lacerations and inability to move left arm after having it crushed in a machine while at work today. PT arm appears swollen and puffy at this time. Acuity: Acute Type of Exam: Initial FINDINGS: Notable soft tissue swelling of the proximal forearm with overlying skin irregularity. No associated fracture or dislocation. No definitive radiopaque retained foreign body. Notable soft tissue swelling of proximal forearm with associated skin irregularity. No fracture or radiopaque retained foreign body. XR WRIST LEFT (MIN 3 VIEWS)    Result Date: 7/19/2021  EXAMINATION: XRAY VIEWS OF THE LEFT WRIST; TWO XRAY VIEWS OF THE LEFT FOREARM; THREE XRAY VIEWS OF THE LEFT ELBOW 7/19/2021 5:59 pm COMPARISON: None. HISTORY: ORDERING SYSTEM PROVIDED HISTORY: pain with palpation TECHNOLOGIST PROVIDED HISTORY: pain with palpation Reason for Exam: PT CO extreme pain with multiple lacerations and inability to move left arm after having it crushed in a machine while at work today.  PT arm appears swollen and puffy at this time. Acuity: Acute Type of Exam: Initial; ORDERING SYSTEM PROVIDED HISTORY: pain and swelling after injury at work TECHNOLOGIST PROVIDED HISTORY: pain and swelling after injury at work Reason for Exam: PT CO extreme pain with multiple lacerations and inability to move left arm after having it crushed in a machine while at work today. PT arm appears swollen and puffy at this time. Acuity: Acute Type of Exam: Initial FINDINGS: Notable soft tissue swelling of the proximal forearm with overlying skin irregularity. No associated fracture or dislocation. No definitive radiopaque retained foreign body. Notable soft tissue swelling of proximal forearm with associated skin irregularity. No fracture or radiopaque retained foreign body. CTA UPPER EXTREMITY LEFT W CONTRAST    Result Date: 7/20/2021  EXAMINATION: CTA OF THE LEFT UPPER EXTREMITY WITH CONTRAST 7/19/2021 10:58 pm TECHNIQUE: CTA of the left upper extremity was performed with the administration of intravenous contrast. Multiplanar reformatted images are provided for review. MIP images are provided for review. Dose modulation, iterative reconstruction, and/or weight based adjustment of the mA/kV was utilized to reduce the radiation dose to as low as reasonably achievable. COMPARISON: Forearm and wrist x-rays 07/19/2021 HISTORY ORDERING SYSTEM PROVIDED HISTORY: crush injury TECHNOLOGIST PROVIDED HISTORY: crush injury Decision Support Exception - unselect if not a suspected or confirmed emergency medical condition->Emergency Medical Condition (MA) Reason for Exam: crush injury, patient states his arm got stuck in a \"roller\" machine a work this afternoon Acuity: Acute Type of Exam: Initial FINDINGS: Artifact related to increased quantum mottle challenge evaluation.   There is suboptimal visualization of the arterial vessels of the hand and wrist.  No significant contrast opacification past the level of the distal radius and ulna noted. Unclear is related to the traumatic injury or timing contrast bolus versus degree of quantum mottle related to being images level of the patient's side. Otherwise vessels proximal to the wrist involving arm and forearm are patent without evidence traumatic injury. Vessels are patent to level of the wrist/radiocarpal joint. No significant arterial enhancement involving the vasculature of the hand noted. Unclear if this is related to traumatic injury involving the vessels/soft tissues versus artifact related to technique/timing or positioning of the hand and wrist. Clinical correlation is strongly recommended with exam findings. If indicated, further evaluation may be beneficial as warranted. Assessment:      1. Crushing injury of left upper extremity, subsequent encounter       Plan:      Discussed with the patient that there is still not a concern for conpartment syndrome at this time. He should focus on edema control of the left forearm. Due to the patient's arm being red and swollen I would like him to take a weeks supply of Keflex 500 mg 4 times a day for prophylactic reasons. The patient should follow up in the office in 1 weeks and knows to call with any questions or concerns. Follow up:Return in about 1 week (around 7/28/2021). Orders Placed This Encounter   Medications    cephALEXin (KEFLEX) 500 MG capsule     Sig: Take 1 capsule by mouth 4 times daily for 7 days     Dispense:  28 capsule     Refill:  0         No orders of the defined types were placed in this encounter. Sammy Thorpe LPN am scribing for and in the presence of Dr. Ruperto Blackmon  7/26/2021 1:57 PM    I have reviewed and made changes accordingly to the work scribed by Kandace Crabtree LPN. The documentation accurately reflects work and decisions made by me. I have also reviewed documentation completed by clinical staff.     Ruperto Blackmon DO, [address] Medicine  7/26/2021 1:57 PM    This note is created with the assistance of a speech recognition program.  While intending to generate a document that actually reflects the content of the visit, the document can still have some errors including those of syntax and sound a like substitutions which may escape proof reading.  In such instances, actual meaning can be extrapolated by contextual diversion      Electronically signed by Kevin Gómez on 7/26/2021 at 1:57 PM

## 2021-07-23 DIAGNOSIS — S49.92XA INJURY OF LEFT UPPER EXTREMITY, INITIAL ENCOUNTER: ICD-10-CM

## 2021-07-26 RX ORDER — OXYCODONE HYDROCHLORIDE AND ACETAMINOPHEN 5; 325 MG/1; MG/1
TABLET ORAL
Qty: 12 TABLET | OUTPATIENT
Start: 2021-07-26

## 2021-07-30 ENCOUNTER — OFFICE VISIT (OUTPATIENT)
Dept: ORTHOPEDIC SURGERY | Age: 56
End: 2021-07-30
Payer: COMMERCIAL

## 2021-07-30 VITALS — HEIGHT: 67 IN | BODY MASS INDEX: 25.11 KG/M2 | WEIGHT: 160 LBS

## 2021-07-30 DIAGNOSIS — S47.2XXD CRUSHING INJURY OF LEFT UPPER EXTREMITY, SUBSEQUENT ENCOUNTER: Primary | ICD-10-CM

## 2021-07-30 PROCEDURE — 99214 OFFICE O/P EST MOD 30 MIN: CPT | Performed by: ORTHOPAEDIC SURGERY

## 2021-07-30 ASSESSMENT — ENCOUNTER SYMPTOMS
ABDOMINAL PAIN: 0
APNEA: 0
VOMITING: 0
CHEST TIGHTNESS: 0
COUGH: 0

## 2021-07-30 NOTE — PROGRESS NOTES
[facility]  [facility]  [address] [address]  Dept: [phone]  Dept Fax: [phone]        Ambulatory Follow Up      Subjective:   Mery Reddy is a 60y.o. year old male who presents to our office today for routine followup regarding his   1. Crushing injury of left upper extremity, subsequent encounter    . Chief Complaint   Patient presents with    Arm Injury     left arm- [facility]/DOI: 7/19/21       HPI- Mery Reddy  is a 60 y.o. Right hand dominant  male who presents today in follow for crushing injury of the right forearm after a work related injury that was sustained on 7/19/21. The patient was last seen on 7/21/2021 and underwent treatment in the form of Keflex and edema control. The patient notes moderate improvement with the previous treatment. The patient states that he is doing better. He is back to work with right handed duties only. He is able to use the left arm but was advised not to work with it at this time. The patient still has swelling to the forearm but it does get better throughout the day. Review of Systems   Constitutional: Negative for chills and fever. Respiratory: Negative for apnea, cough and chest tightness. Cardiovascular: Negative for chest pain and palpitations. Gastrointestinal: Negative for abdominal pain and vomiting. Genitourinary: Negative for difficulty urinating. Musculoskeletal: Positive for arthralgias (left forearm). Negative for gait problem, joint swelling and myalgias. Neurological: Negative for dizziness, weakness and numbness. I have reviewed the CC, HPI, ROS, PMH, FHX, Social History, and if not present in this note, I have reviewed in the patient's chart. I agree with the documentation provided by other staff and have reviewed their documentation prior to providing my signature indicating agreement.     Objective :   Ht 5' 7\" (1.702 m)   Wt 160 lb (72.6 kg)   BMI 25.06 kg/m² Body mass index is 25.06 kg/m². General: Jose Armando Gibbons is a 60 y.o. male who is alert and oriented and sitting comfortably in our office. Ortho Exam  MS:   Laceration dorsal left forearm improving, can make a tight fist. ecchymosis medial left elbow improving. Motor, sensory, vascular examination of the left upper extremity is grossly intact without focal deficits. Neuro: alert and oriented to person and place. Eyes: Extra-ocular muscles intact  Mouth: Oral mucosa moist. No perioral lesions  Pulm: Respirations unlabored and regular. Symmetric chest excursion without outward deformity is noted. Skin: warm, well perfused  Psych:   Patient has good fund of knowledge and displays understanging of exam, diagnosis, and plan. Radiology:     XR ELBOW LEFT (MIN 3 VIEWS)    Result Date: 7/19/2021  EXAMINATION: XRAY VIEWS OF THE LEFT WRIST; TWO XRAY VIEWS OF THE LEFT FOREARM; THREE XRAY VIEWS OF THE LEFT ELBOW 7/19/2021 5:59 pm COMPARISON: None. HISTORY: ORDERING SYSTEM PROVIDED HISTORY: pain with palpation TECHNOLOGIST PROVIDED HISTORY: pain with palpation Reason for Exam: PT CO extreme pain with multiple lacerations and inability to move left arm after having it crushed in a machine while at work today. PT arm appears swollen and puffy at this time. Acuity: Acute Type of Exam: Initial; ORDERING SYSTEM PROVIDED HISTORY: pain and swelling after injury at work TECHNOLOGIST PROVIDED HISTORY: pain and swelling after injury at work Reason for Exam: PT CO extreme pain with multiple lacerations and inability to move left arm after having it crushed in a machine while at work today. PT arm appears swollen and puffy at this time. Acuity: Acute Type of Exam: Initial FINDINGS: Notable soft tissue swelling of the proximal forearm with overlying skin irregularity. No associated fracture or dislocation. No definitive radiopaque retained foreign body.      Notable soft tissue swelling of proximal forearm with associated skin irregularity. No fracture or radiopaque retained foreign body. XR RADIUS ULNA LEFT (2 VIEWS)    Result Date: 7/19/2021  EXAMINATION: XRAY VIEWS OF THE LEFT WRIST; TWO XRAY VIEWS OF THE LEFT FOREARM; THREE XRAY VIEWS OF THE LEFT ELBOW 7/19/2021 5:59 pm COMPARISON: None. HISTORY: ORDERING SYSTEM PROVIDED HISTORY: pain with palpation TECHNOLOGIST PROVIDED HISTORY: pain with palpation Reason for Exam: PT CO extreme pain with multiple lacerations and inability to move left arm after having it crushed in a machine while at work today. PT arm appears swollen and puffy at this time. Acuity: Acute Type of Exam: Initial; ORDERING SYSTEM PROVIDED HISTORY: pain and swelling after injury at work TECHNOLOGIST PROVIDED HISTORY: pain and swelling after injury at work Reason for Exam: PT CO extreme pain with multiple lacerations and inability to move left arm after having it crushed in a machine while at work today. PT arm appears swollen and puffy at this time. Acuity: Acute Type of Exam: Initial FINDINGS: Notable soft tissue swelling of the proximal forearm with overlying skin irregularity. No associated fracture or dislocation. No definitive radiopaque retained foreign body. Notable soft tissue swelling of proximal forearm with associated skin irregularity. No fracture or radiopaque retained foreign body. XR WRIST LEFT (MIN 3 VIEWS)    Result Date: 7/19/2021  EXAMINATION: XRAY VIEWS OF THE LEFT WRIST; TWO XRAY VIEWS OF THE LEFT FOREARM; THREE XRAY VIEWS OF THE LEFT ELBOW 7/19/2021 5:59 pm COMPARISON: None. HISTORY: ORDERING SYSTEM PROVIDED HISTORY: pain with palpation TECHNOLOGIST PROVIDED HISTORY: pain with palpation Reason for Exam: PT CO extreme pain with multiple lacerations and inability to move left arm after having it crushed in a machine while at work today. PT arm appears swollen and puffy at this time.  Acuity: Acute Type of Exam: Initial; ORDERING SYSTEM PROVIDED HISTORY: pain and swelling after injury at work TECHNOLOGIST PROVIDED HISTORY: pain and swelling after injury at work Reason for Exam: PT CO extreme pain with multiple lacerations and inability to move left arm after having it crushed in a machine while at work today. PT arm appears swollen and puffy at this time. Acuity: Acute Type of Exam: Initial FINDINGS: Notable soft tissue swelling of the proximal forearm with overlying skin irregularity. No associated fracture or dislocation. No definitive radiopaque retained foreign body. Notable soft tissue swelling of proximal forearm with associated skin irregularity. No fracture or radiopaque retained foreign body. CTA UPPER EXTREMITY LEFT W CONTRAST    Result Date: 7/20/2021  EXAMINATION: CTA OF THE LEFT UPPER EXTREMITY WITH CONTRAST 7/19/2021 10:58 pm TECHNIQUE: CTA of the left upper extremity was performed with the administration of intravenous contrast. Multiplanar reformatted images are provided for review. MIP images are provided for review. Dose modulation, iterative reconstruction, and/or weight based adjustment of the mA/kV was utilized to reduce the radiation dose to as low as reasonably achievable. COMPARISON: Forearm and wrist x-rays 07/19/2021 HISTORY ORDERING SYSTEM PROVIDED HISTORY: crush injury TECHNOLOGIST PROVIDED HISTORY: crush injury Decision Support Exception - unselect if not a suspected or confirmed emergency medical condition->Emergency Medical Condition (MA) Reason for Exam: crush injury, patient states his arm got stuck in a \"roller\" machine a work this afternoon Acuity: Acute Type of Exam: Initial FINDINGS: Artifact related to increased quantum mottle challenge evaluation. There is suboptimal visualization of the arterial vessels of the hand and wrist.  No significant contrast opacification past the level of the distal radius and ulna noted.   Unclear is related to the traumatic injury or timing contrast bolus versus degree of quantum mottle related to being images level which may escape proof reading.  In such instances, actual meaning can be extrapolated by contextual diversion      Electronically signed by Panfilo Malloy DO, FAOAO on 7/31/2021 at 6:11 AM

## 2021-08-13 ENCOUNTER — OFFICE VISIT (OUTPATIENT)
Dept: ORTHOPEDIC SURGERY | Age: 56
End: 2021-08-13
Payer: COMMERCIAL

## 2021-08-13 VITALS — BODY MASS INDEX: 25.77 KG/M2 | WEIGHT: 164.2 LBS | HEIGHT: 67 IN

## 2021-08-13 DIAGNOSIS — S47.2XXD CRUSHING INJURY OF LEFT UPPER EXTREMITY, SUBSEQUENT ENCOUNTER: Primary | ICD-10-CM

## 2021-08-13 PROCEDURE — 99213 OFFICE O/P EST LOW 20 MIN: CPT | Performed by: ORTHOPAEDIC SURGERY

## 2021-08-13 ASSESSMENT — ENCOUNTER SYMPTOMS
ABDOMINAL PAIN: 0
COUGH: 0
CHEST TIGHTNESS: 0
APNEA: 0
VOMITING: 0

## 2021-08-13 NOTE — PROGRESS NOTES
[facility]  [facility]  [address] [address]  Dept: [phone]  Dept Fax: [phone]        Ambulatory Follow Up      Subjective:   Raquel Donnelly is a 60y.o. year old male who presents to our office today for routine followup regarding his   1. Crushing injury of left upper extremity, subsequent encounter    . Chief Complaint   Patient presents with    Follow-up     [facility], L FOREARM       HPI-  HPI- Raquel Donnelly  is a 60 y.o. Right hand dominant  male who presents today in follow for crushing injury of the right forearm after a work related injury that was sustained on 7/19/21. The patient was last seen on 7/30/21 and at that time had asked him to work on edema control of the right upper extremity and home exercise program for range of motion. The patient overall feels like he is doing well and has no complaints. The patient has been working with restrictions of right handed duties only. He states that he has been using his other hand for his duties at times as well without complaints. Review of Systems   Constitutional: Negative for chills and fever. Respiratory: Negative for apnea, cough and chest tightness. Cardiovascular: Negative for chest pain and palpitations. Gastrointestinal: Negative for abdominal pain and vomiting. Genitourinary: Negative for difficulty urinating. Musculoskeletal: Positive for arthralgias (left forearm). Negative for gait problem, joint swelling and myalgias. Neurological: Negative for dizziness, weakness and numbness. I have reviewed the CC, HPI, ROS, PMH, FHX, Social History, and if not present in this note, I have reviewed in the patient's chart. I agree with the documentation provided by other staff and have reviewed their documentation prior to providing my signature indicating agreement.     Objective :   Ht 5' 7\" (1.702 m)   Wt 164 lb 3.2 oz (74.5 kg)   BMI 25.72 kg/m²  Body mass index is 25.72 kg/m². General: Basilio Simpson is a 60 y.o. male who is alert and oriented and sitting comfortably in our office. Ortho Exam  MS:  Resolving eccymosis left forearm. Laceration healing well without sign of infection left forearm. Diminished sensation LACN left forearm. Minimal swelling left forearm is appreciated. Motor, sensory, vascular examination left upper extremity is grossly intact without focal deficits otherwise. Neuro: alert and oriented to person and place. Eyes: Extra-ocular muscles intact  Mouth: Oral mucosa moist. No perioral lesions  Pulm: Respirations unlabored and regular. Symmetric chest excursion without outward deformity is noted. Skin: warm, well perfused  Psych:   Patient has good fund of knowledge and displays understanging of exam, diagnosis, and plan. Radiology:   XR ELBOW LEFT (MIN 3 VIEWS)    Result Date: 7/19/2021  EXAMINATION: XRAY VIEWS OF THE LEFT WRIST; TWO XRAY VIEWS OF THE LEFT FOREARM; THREE XRAY VIEWS OF THE LEFT ELBOW 7/19/2021 5:59 pm COMPARISON: None. HISTORY: ORDERING SYSTEM PROVIDED HISTORY: pain with palpation TECHNOLOGIST PROVIDED HISTORY: pain with palpation Reason for Exam: PT CO extreme pain with multiple lacerations and inability to move left arm after having it crushed in a machine while at work today. PT arm appears swollen and puffy at this time. Acuity: Acute Type of Exam: Initial; ORDERING SYSTEM PROVIDED HISTORY: pain and swelling after injury at work TECHNOLOGIST PROVIDED HISTORY: pain and swelling after injury at work Reason for Exam: PT CO extreme pain with multiple lacerations and inability to move left arm after having it crushed in a machine while at work today. PT arm appears swollen and puffy at this time. Acuity: Acute Type of Exam: Initial FINDINGS: Notable soft tissue swelling of the proximal forearm with overlying skin irregularity. No associated fracture or dislocation. No definitive radiopaque retained foreign body.      Notable soft tissue swelling of proximal forearm with associated skin irregularity. No fracture or radiopaque retained foreign body. XR RADIUS ULNA LEFT (2 VIEWS)    Result Date: 7/19/2021  EXAMINATION: XRAY VIEWS OF THE LEFT WRIST; TWO XRAY VIEWS OF THE LEFT FOREARM; THREE XRAY VIEWS OF THE LEFT ELBOW 7/19/2021 5:59 pm COMPARISON: None. HISTORY: ORDERING SYSTEM PROVIDED HISTORY: pain with palpation TECHNOLOGIST PROVIDED HISTORY: pain with palpation Reason for Exam: PT CO extreme pain with multiple lacerations and inability to move left arm after having it crushed in a machine while at work today. PT arm appears swollen and puffy at this time. Acuity: Acute Type of Exam: Initial; ORDERING SYSTEM PROVIDED HISTORY: pain and swelling after injury at work TECHNOLOGIST PROVIDED HISTORY: pain and swelling after injury at work Reason for Exam: PT CO extreme pain with multiple lacerations and inability to move left arm after having it crushed in a machine while at work today. PT arm appears swollen and puffy at this time. Acuity: Acute Type of Exam: Initial FINDINGS: Notable soft tissue swelling of the proximal forearm with overlying skin irregularity. No associated fracture or dislocation. No definitive radiopaque retained foreign body. Notable soft tissue swelling of proximal forearm with associated skin irregularity. No fracture or radiopaque retained foreign body. XR WRIST LEFT (MIN 3 VIEWS)    Result Date: 7/19/2021  EXAMINATION: XRAY VIEWS OF THE LEFT WRIST; TWO XRAY VIEWS OF THE LEFT FOREARM; THREE XRAY VIEWS OF THE LEFT ELBOW 7/19/2021 5:59 pm COMPARISON: None. HISTORY: ORDERING SYSTEM PROVIDED HISTORY: pain with palpation TECHNOLOGIST PROVIDED HISTORY: pain with palpation Reason for Exam: PT CO extreme pain with multiple lacerations and inability to move left arm after having it crushed in a machine while at work today. PT arm appears swollen and puffy at this time.  Acuity: Acute Type of Exam: Initial; ORDERING SYSTEM PROVIDED HISTORY: pain and swelling after injury at work TECHNOLOGIST PROVIDED HISTORY: pain and swelling after injury at work Reason for Exam: PT CO extreme pain with multiple lacerations and inability to move left arm after having it crushed in a machine while at work today. PT arm appears swollen and puffy at this time. Acuity: Acute Type of Exam: Initial FINDINGS: Notable soft tissue swelling of the proximal forearm with overlying skin irregularity. No associated fracture or dislocation. No definitive radiopaque retained foreign body. Notable soft tissue swelling of proximal forearm with associated skin irregularity. No fracture or radiopaque retained foreign body. CTA UPPER EXTREMITY LEFT W CONTRAST    Result Date: 7/20/2021  EXAMINATION: CTA OF THE LEFT UPPER EXTREMITY WITH CONTRAST 7/19/2021 10:58 pm TECHNIQUE: CTA of the left upper extremity was performed with the administration of intravenous contrast. Multiplanar reformatted images are provided for review. MIP images are provided for review. Dose modulation, iterative reconstruction, and/or weight based adjustment of the mA/kV was utilized to reduce the radiation dose to as low as reasonably achievable. COMPARISON: Forearm and wrist x-rays 07/19/2021 HISTORY ORDERING SYSTEM PROVIDED HISTORY: crush injury TECHNOLOGIST PROVIDED HISTORY: crush injury Decision Support Exception - unselect if not a suspected or confirmed emergency medical condition->Emergency Medical Condition (MA) Reason for Exam: crush injury, patient states his arm got stuck in a \"roller\" machine a work this afternoon Acuity: Acute Type of Exam: Initial FINDINGS: Artifact related to increased quantum mottle challenge evaluation. There is suboptimal visualization of the arterial vessels of the hand and wrist.  No significant contrast opacification past the level of the distal radius and ulna noted.   Unclear is related to the traumatic injury or timing contrast bolus versus degree of quantum mottle related to being images level of the patient's side. Otherwise vessels proximal to the wrist involving arm and forearm are patent without evidence traumatic injury. Vessels are patent to level of the wrist/radiocarpal joint. No significant arterial enhancement involving the vasculature of the hand noted. Unclear if this is related to traumatic injury involving the vessels/soft tissues versus artifact related to technique/timing or positioning of the hand and wrist. Clinical correlation is strongly recommended with exam findings. If indicated, further evaluation may be beneficial as warranted. Assessment:      1. Crushing injury of left upper extremity, subsequent encounter       Plan:     Discussed with the patient that everything is healing well and his swelling has improved greatly. I do feel he is able to return to work without restrictions for the next 6 weeks. He can follow up in the off ice in 6 weeks for a swelling check and see how work is going. The patient knows to call with any questions or concerns. Follow up:Return in about 6 weeks (around 9/24/2021). No orders of the defined types were placed in this encounter. No orders of the defined types were placed in this encounter. Deann Schultz LPN am scribing for and in the presence of Dr. Marely Ramos  8/15/2021 12:31 PM      I have reviewed and made changes accordingly to the work scribed by Vladimir Cristobal LPN. The documentation accurately reflects work and decisions made by me. I have also reviewed documentation completed by clinical staff.     Marely Ramos DO, [facility] Medicine  8/15/2021 12:31 PM    This note is created with the assistance of a speech recognition program.  While intending to generate a document that actually reflects the content of the visit, the document can still have some errors including those of syntax and sound a like substitutions which may escape proof reading.  In such instances, actual meaning can be extrapolated by contextual diversion      Electronically signed by Santa Ndiaye DO, FAOAO on 8/15/2021 at 12:31 PM

## 2021-08-13 NOTE — LETTER
[facility]  [address] [facility]  Phone: [phone]  Fax: Deuce Barnard DO        August 13, 2021     Patient: Grabiel Marina   YOB: 1965   Date of Visit: 8/13/2021       To Whom it May Concern:    Grabiel Marina was seen in my clinic on 8/13/2021. He may return to work on 8/13/21 without restrictions. If you have any questions or concerns, please don't hesitate to call.     Sincerely,       Giacomo Colunga DO